# Patient Record
Sex: MALE | Race: WHITE
[De-identification: names, ages, dates, MRNs, and addresses within clinical notes are randomized per-mention and may not be internally consistent; named-entity substitution may affect disease eponyms.]

---

## 2018-06-22 ENCOUNTER — HOSPITAL ENCOUNTER (INPATIENT)
Dept: HOSPITAL 96 - M.ICU | Age: 68
LOS: 6 days | Discharge: HOME | DRG: 417 | End: 2018-06-28
Attending: INTERNAL MEDICINE | Admitting: INTERNAL MEDICINE
Payer: MEDICARE

## 2018-06-22 VITALS — DIASTOLIC BLOOD PRESSURE: 102 MMHG | SYSTOLIC BLOOD PRESSURE: 145 MMHG

## 2018-06-22 VITALS — DIASTOLIC BLOOD PRESSURE: 88 MMHG | SYSTOLIC BLOOD PRESSURE: 144 MMHG

## 2018-06-22 VITALS — BODY MASS INDEX: 38.06 KG/M2 | HEIGHT: 72 IN | WEIGHT: 281 LBS

## 2018-06-22 VITALS — DIASTOLIC BLOOD PRESSURE: 86 MMHG | SYSTOLIC BLOOD PRESSURE: 142 MMHG

## 2018-06-22 VITALS — DIASTOLIC BLOOD PRESSURE: 86 MMHG | SYSTOLIC BLOOD PRESSURE: 153 MMHG

## 2018-06-22 DIAGNOSIS — R65.11: ICD-10-CM

## 2018-06-22 DIAGNOSIS — Z95.1: ICD-10-CM

## 2018-06-22 DIAGNOSIS — Z79.4: ICD-10-CM

## 2018-06-22 DIAGNOSIS — Z79.899: ICD-10-CM

## 2018-06-22 DIAGNOSIS — Z79.01: ICD-10-CM

## 2018-06-22 DIAGNOSIS — I25.10: ICD-10-CM

## 2018-06-22 DIAGNOSIS — E44.1: ICD-10-CM

## 2018-06-22 DIAGNOSIS — N18.4: ICD-10-CM

## 2018-06-22 DIAGNOSIS — Z82.49: ICD-10-CM

## 2018-06-22 DIAGNOSIS — E78.5: ICD-10-CM

## 2018-06-22 DIAGNOSIS — D64.9: ICD-10-CM

## 2018-06-22 DIAGNOSIS — K85.10: ICD-10-CM

## 2018-06-22 DIAGNOSIS — N17.9: ICD-10-CM

## 2018-06-22 DIAGNOSIS — N40.0: ICD-10-CM

## 2018-06-22 DIAGNOSIS — K80.12: Primary | ICD-10-CM

## 2018-06-22 DIAGNOSIS — E11.22: ICD-10-CM

## 2018-06-22 DIAGNOSIS — Z88.6: ICD-10-CM

## 2018-06-22 DIAGNOSIS — Z80.9: ICD-10-CM

## 2018-06-22 DIAGNOSIS — I13.0: ICD-10-CM

## 2018-06-22 DIAGNOSIS — E66.01: ICD-10-CM

## 2018-06-22 DIAGNOSIS — D68.9: ICD-10-CM

## 2018-06-22 DIAGNOSIS — Z79.2: ICD-10-CM

## 2018-06-22 DIAGNOSIS — K21.9: ICD-10-CM

## 2018-06-22 DIAGNOSIS — Z83.3: ICD-10-CM

## 2018-06-22 DIAGNOSIS — R00.1: ICD-10-CM

## 2018-06-22 DIAGNOSIS — I48.2: ICD-10-CM

## 2018-06-22 DIAGNOSIS — I73.9: ICD-10-CM

## 2018-06-22 LAB
ABSOLUTE BASOPHILS: 0 THOU/UL (ref 0–0.2)
ABSOLUTE EOSINOPHILS: 0.1 THOU/UL (ref 0–0.7)
ABSOLUTE MONOCYTES: 0.5 THOU/UL (ref 0–1.2)
ALBUMIN SERPL-MCNC: 3.1 G/DL (ref 3.4–5)
ALP SERPL-CCNC: 54 U/L (ref 46–116)
ALT SERPL-CCNC: 18 U/L (ref 30–65)
ANION GAP SERPL CALC-SCNC: 7 MMOL/L (ref 7–16)
APTT BLD: 48.8 SECONDS (ref 25–31.3)
AST SERPL-CCNC: 44 U/L (ref 15–37)
BASOPHILS NFR BLD AUTO: 0.6 %
BILIRUB SERPL-MCNC: 1.9 MG/DL
BUN SERPL-MCNC: 36 MG/DL (ref 7–18)
CALCIUM SERPL-MCNC: 8.5 MG/DL (ref 8.5–10.1)
CHLORIDE SERPL-SCNC: 101 MMOL/L (ref 98–107)
CO2 SERPL-SCNC: 27 MMOL/L (ref 21–32)
CREAT SERPL-MCNC: 2.4 MG/DL (ref 0.6–1.3)
EOSINOPHIL NFR BLD: 1.3 %
GLUCOSE SERPL-MCNC: 133 MG/DL (ref 70–99)
GRANULOCYTES NFR BLD MANUAL: 80.8 %
HCT VFR BLD CALC: 29.8 % (ref 42–52)
HGB BLD-MCNC: 9.8 GM/DL (ref 14–18)
INR PPP: 2.3
LIPASE: 631 U/L (ref 73–393)
LYMPHOCYTES # BLD: 0.8 THOU/UL (ref 0.8–5.3)
LYMPHOCYTES NFR BLD AUTO: 10.7 %
MAGNESIUM SERPL-MCNC: 2.7 MG/DL (ref 1.8–2.4)
MCH RBC QN AUTO: 30.1 PG (ref 26–34)
MCHC RBC AUTO-ENTMCNC: 32.9 G/DL (ref 28–37)
MCV RBC: 91.3 FL (ref 80–100)
MONOCYTES NFR BLD: 6.6 %
MPV: 10.1 FL. (ref 7.2–11.1)
NEUTROPHILS # BLD: 6.1 THOU/UL (ref 1.6–8.1)
NUCLEATED RBCS: 0 /100WBC
PLATELET COUNT*: 172 THOU/UL (ref 150–400)
POTASSIUM SERPL-SCNC: 4 MMOL/L (ref 3.5–5.1)
PROT SERPL-MCNC: 6.2 G/DL (ref 6.4–8.2)
PROTHROMBIN TIME: 22.1 SECONDS (ref 9.2–11.5)
RBC # BLD AUTO: 3.27 MIL/UL (ref 4.5–6)
RDW-CV: 16.1 % (ref 10.5–14.5)
SODIUM SERPL-SCNC: 135 MMOL/L (ref 136–145)
WBC # BLD AUTO: 7.5 THOU/UL (ref 4–11)

## 2018-06-23 VITALS — SYSTOLIC BLOOD PRESSURE: 129 MMHG | DIASTOLIC BLOOD PRESSURE: 69 MMHG

## 2018-06-23 VITALS — SYSTOLIC BLOOD PRESSURE: 144 MMHG | DIASTOLIC BLOOD PRESSURE: 81 MMHG

## 2018-06-23 VITALS — SYSTOLIC BLOOD PRESSURE: 140 MMHG | DIASTOLIC BLOOD PRESSURE: 89 MMHG

## 2018-06-23 VITALS — SYSTOLIC BLOOD PRESSURE: 145 MMHG | DIASTOLIC BLOOD PRESSURE: 83 MMHG

## 2018-06-23 VITALS — SYSTOLIC BLOOD PRESSURE: 140 MMHG | DIASTOLIC BLOOD PRESSURE: 87 MMHG

## 2018-06-23 VITALS — SYSTOLIC BLOOD PRESSURE: 137 MMHG | DIASTOLIC BLOOD PRESSURE: 77 MMHG

## 2018-06-23 VITALS — DIASTOLIC BLOOD PRESSURE: 75 MMHG | SYSTOLIC BLOOD PRESSURE: 137 MMHG

## 2018-06-23 VITALS — DIASTOLIC BLOOD PRESSURE: 78 MMHG | SYSTOLIC BLOOD PRESSURE: 137 MMHG

## 2018-06-23 VITALS — SYSTOLIC BLOOD PRESSURE: 155 MMHG | DIASTOLIC BLOOD PRESSURE: 84 MMHG

## 2018-06-23 VITALS — DIASTOLIC BLOOD PRESSURE: 98 MMHG | SYSTOLIC BLOOD PRESSURE: 142 MMHG

## 2018-06-23 VITALS — SYSTOLIC BLOOD PRESSURE: 141 MMHG | DIASTOLIC BLOOD PRESSURE: 82 MMHG

## 2018-06-23 VITALS — DIASTOLIC BLOOD PRESSURE: 87 MMHG | SYSTOLIC BLOOD PRESSURE: 150 MMHG

## 2018-06-23 LAB
ALBUMIN SERPL-MCNC: 3 G/DL (ref 3.4–5)
ALP SERPL-CCNC: 48 U/L (ref 46–116)
ALT SERPL-CCNC: 19 U/L (ref 30–65)
ANION GAP SERPL CALC-SCNC: 8 MMOL/L (ref 7–16)
AST SERPL-CCNC: 44 U/L (ref 15–37)
BILIRUB SERPL-MCNC: 2 MG/DL
BUN SERPL-MCNC: 34 MG/DL (ref 7–18)
BUN SERPL-MCNC: 34 MG/DL (ref 7–18)
CALCIUM SERPL-MCNC: 8.4 MG/DL (ref 8.5–10.1)
CHLORIDE SERPL-SCNC: 104 MMOL/L (ref 98–107)
CO2 SERPL-SCNC: 25 MMOL/L (ref 21–32)
CREAT SERPL-MCNC: 2.2 MG/DL (ref 0.6–1.3)
CREAT SERPL-MCNC: 2.2 MG/DL (ref 0.6–1.3)
GLUCOSE SERPL-MCNC: 83 MG/DL (ref 70–99)
HCT VFR BLD CALC: 28.2 % (ref 42–52)
HGB BLD-MCNC: 9.4 GM/DL (ref 14–18)
INR PPP: 1.4
MAGNESIUM SERPL-MCNC: 2.3 MG/DL (ref 1.8–2.4)
MCH RBC QN AUTO: 30.7 PG (ref 26–34)
MCHC RBC AUTO-ENTMCNC: 33.4 G/DL (ref 28–37)
MCV RBC: 91.7 FL (ref 80–100)
MPV: 10.6 FL. (ref 7.2–11.1)
PLATELET COUNT*: 159 THOU/UL (ref 150–400)
POTASSIUM SERPL-SCNC: 4.1 MMOL/L (ref 3.5–5.1)
PROT SERPL-MCNC: 5.5 G/DL (ref 6.4–8.2)
PROTHROMBIN TIME: 13.7 SECONDS (ref 9.2–11.5)
RBC # BLD AUTO: 3.07 MIL/UL (ref 4.5–6)
RDW-CV: 16.1 % (ref 10.5–14.5)
SODIUM SERPL-SCNC: 137 MMOL/L (ref 136–145)
SODIUM SERPL-SCNC: 137 MMOL/L (ref 136–145)
WBC # BLD AUTO: 7 THOU/UL (ref 4–11)

## 2018-06-23 NOTE — EKG
[place] [address]
Phone:  [phone]                     ELECTROCARDIOGRAM REPORT      
_______________________________________________________________________________
 
Name:       KARLIE JULIEN JR             Room:           [address]    ADM IN  
M.R.#:  [id number]      Account #:      [id number]  
Admission:  18     Attend Phys:    Cuauhtemoc Villeda MD 
Discharge:               Date of Birth:  [birth year]  
         Report #: [id number]
    [id number]
_______________________________________________________________________________
THIS REPORT FOR:  //name//                      
 
                          [facility]
                                       
Test Date:    2018               Test Time:    20:08:58
Pat Name:     KARLIE JULIEN            Department:   
Patient ID:   [id number]            Room:         [address]
Gender:       M                        Technician:   JB GOEL
:          1950               Requested By: Cuauhtemoc Villeda
Order Number: [id number]    Reading MD:   Norberto Olson
                                 Measurements
Intervals                              Axis          
Rate:         70                       P:            
ID:                                    QRS:          86
QRSD:         154                      T:            -14
QT:           424                                    
QTc:          458                                    
                           Interpretive Statements
Atrial fibrillation
Right bundle branch block
ST depr, consider ischemia, anterolateral lds
Compared to ECG 2014 10:40:37
Right bundle-branch block now present
Possible ischemia now present
Sinus bradycardia no longer present
 
Electronically Signed On 2018 12:49:49 CDT by Norberto Olson
[email or web address]
 
 
 
 
 
 
 
 
 
 
 
 
 
 
 
  <ELECTRONICALLY SIGNED>
                                           By: Norberto Olson MD, [facility]   
  18     1249
D: 18   _____________________________________
T: 18   Norberto Olson MD, [facility]     /EPI

## 2018-06-24 VITALS — SYSTOLIC BLOOD PRESSURE: 121 MMHG | DIASTOLIC BLOOD PRESSURE: 96 MMHG

## 2018-06-24 VITALS — DIASTOLIC BLOOD PRESSURE: 73 MMHG | SYSTOLIC BLOOD PRESSURE: 118 MMHG

## 2018-06-24 VITALS — DIASTOLIC BLOOD PRESSURE: 84 MMHG | SYSTOLIC BLOOD PRESSURE: 148 MMHG

## 2018-06-24 VITALS — SYSTOLIC BLOOD PRESSURE: 147 MMHG | DIASTOLIC BLOOD PRESSURE: 81 MMHG

## 2018-06-24 VITALS — DIASTOLIC BLOOD PRESSURE: 76 MMHG | SYSTOLIC BLOOD PRESSURE: 124 MMHG

## 2018-06-24 VITALS — DIASTOLIC BLOOD PRESSURE: 85 MMHG | SYSTOLIC BLOOD PRESSURE: 127 MMHG

## 2018-06-24 VITALS — DIASTOLIC BLOOD PRESSURE: 79 MMHG | SYSTOLIC BLOOD PRESSURE: 121 MMHG

## 2018-06-24 LAB
ALBUMIN SERPL-MCNC: 2.6 G/DL (ref 3.4–5)
ALP SERPL-CCNC: 45 U/L (ref 46–116)
ALT SERPL-CCNC: 17 U/L (ref 30–65)
ANION GAP SERPL CALC-SCNC: 6 MMOL/L (ref 7–16)
AST SERPL-CCNC: 50 U/L (ref 15–37)
BILIRUB SERPL-MCNC: 1.7 MG/DL
BUN SERPL-MCNC: 23 MG/DL (ref 7–18)
CALCIUM SERPL-MCNC: 8.4 MG/DL (ref 8.5–10.1)
CHLORIDE SERPL-SCNC: 106 MMOL/L (ref 98–107)
CO2 SERPL-SCNC: 25 MMOL/L (ref 21–32)
CREAT SERPL-MCNC: 1.9 MG/DL (ref 0.6–1.3)
GLUCOSE SERPL-MCNC: 118 MG/DL (ref 70–99)
HCT VFR BLD CALC: 26.1 % (ref 42–52)
HGB BLD-MCNC: 8.8 GM/DL (ref 14–18)
INR PPP: 1.2
MAGNESIUM SERPL-MCNC: 2.1 MG/DL (ref 1.8–2.4)
MCH RBC QN AUTO: 30.9 PG (ref 26–34)
MCHC RBC AUTO-ENTMCNC: 33.7 G/DL (ref 28–37)
MCV RBC: 91.7 FL (ref 80–100)
MPV: 9.3 FL. (ref 7.2–11.1)
PLATELET COUNT*: 156 THOU/UL (ref 150–400)
POTASSIUM SERPL-SCNC: 3.9 MMOL/L (ref 3.5–5.1)
PROT SERPL-MCNC: 5.5 G/DL (ref 6.4–8.2)
PROTHROMBIN TIME: 11.9 SECONDS (ref 9.2–11.5)
RBC # BLD AUTO: 2.84 MIL/UL (ref 4.5–6)
RDW-CV: 15.7 % (ref 10.5–14.5)
SODIUM SERPL-SCNC: 137 MMOL/L (ref 136–145)
WBC # BLD AUTO: 4.6 THOU/UL (ref 4–11)

## 2018-06-24 NOTE — CON
[address]  [address]                    CONSULTATION                  
_______________________________________________________________________________
 
Name:       KARLIE JULIEN JR             Room:           [address] [place]  
[facility]#:  [id number]      Account #:      [id number]  
Admission:  06/22/18     Attend Phys:    Cuauhtemoc Villeda MD 
Discharge:               Date of Birth:  [birth year]  
         Report #: [id number]
                                                                     [id number]  
_______________________________________________________________________________
THIS REPORT FOR:  //name//                      
 
CC: Cuauhtemoc Ramos
 
DATE OF SERVICE:  06/23/2018
 
 
PRIMARY CARE PHYSICIAN:  Dr. Justin Ramos.
 
CARDIOLOGIST:  Dr. Kvng Taylor.
 
CHIEF COMPLAINT:  Abdominal pain and bradycardia.
 
HISTORY OF PRESENT ILLNESS:  The patient is a 68-year-old man with a history of
remote bypass surgery and atrial fibrillation which is persistent.  He presented
to the Emergency Room at [facility] with abdominal pain.  There was
concern he had cholecystitis.  However, we are asked to see him in transfer
because of severe bradycardia, heart rates down in the 30s-40s.  He apparently
received an injection of Dilaudid, got dizzy and lightheadedness and heart rate
decreased to the 20s.  He had been on a high dose of beta blocker for rate
control of his atrial fibrillation.  Over the last 24 hours, his beta blocker
has been held and currently his heart rates are in the 70s.
 
He has been having abdominal discomfort in his lower right quadrant for the past
week or so.  He denies fevers, chills, nausea, vomiting.  He has been taking
p.o.  He denies constipation.
 
From a cardiovascular standpoint, he has a history of remote CABG, but denies
chest pain or pressure actively.
 
He denies weight loss or edema.
 
He does have chronic lower extremity foot and leg swelling.
 
PAST MEDICAL HISTORY:  Status post CABG 3 vessels in 1996 at [facility], persistent atrial fibrillation, hyperlipidemia, morbid obesity and
chronic kidney disease, type 2 diabetes mellitus.  Warfarin anticoagulation.
 
HOME MEDICATIONS:  Include atorvastatin 80 mg daily, fenofibrate 145 mg daily,
Lasix 40 mg daily, glipizide, metformin, metoprolol 100 mg, Protonix,
tamsulosin, warfarin 2.5 mg daily.
 
SOCIAL HISTORY:  Nonsmoker.
 
REVIEW OF SYSTEMS: 
 
 
 
[place] [address]                    CONSULTATION                  
_______________________________________________________________________________
 
Name:       KARLIE JULIEN JR             Room:           [address]#:  [id number]      Account #:      [id number]  
Admission:  06/22/18     Attend Phys:    Cuauhtemoc Villeda MD 
Discharge:               Date of Birth:  [birth year]  
         Report #: [id number]
                                                                     [id number]  
_______________________________________________________________________________
CARDIOVASCULAR:  No chest pain.  Positive dyspnea on exertion.  Positive edema. 
No palpitations.
GENERAL:  No weight loss or fevers.
RESPIRATORY:  No cough or sputum production.
ENDOCRINE:  Positive diabetes. No thyroid disease.
GASTROINTESTINAL:  No bleeding, hematemesis or melena.
GENITOURINARY:  No dysuria or hematuria.
HEMATOLOGIC:  No anemia or bleeding disorders.
ALLERGIES:  No seasonal, medical, aspirin or contrast dye.
PSYCHIATRIC:  No depression or anxiety.
MUSCULOSKELETAL:  No arthritis, connective tissue disease.
SKIN:  No rashes.
EYES:  He does use glasses.
EARS, NOSE, THROAT AND MOUTH:  No decreased hearing.  Positive dentures.
 
PHYSICAL EXAMINATION:
VITAL SIGNS:  Blood pressure is 120/70, atrial fibrillation in the 70s.
GENERAL:  Pleasant elderly male.  He is alert, oriented, no apparent distress.
HEENT:  Eyes are intact.  No facial asymmetry.
NECK:  Supple.  No jugular venous distention.
CARDIOVASCULAR:  Irregular.  I cannot hear a rub or gallop.
LUNGS:  Clear to auscultation.
ABDOMEN:  Soft, nontender.
 
LABORATORY DATA:  From [facility]:  He had a bradycardic episode with
heart rate in the 30s, in atrial fibrillation ,with a baseline wider QRS
complex.  Hemoglobin is 9.0.  Glucose 127.  Sodium 136, potassium 3.7, chloride
102, BUN is 40, creatinine is 2.7.  INR was 8.1.  Hemoglobin is 9.2, white blood
cell count 7.8, platelet count 164,000.
 
IMPRESSION:
1.  Bradycardia, currently after holding his beta blockers and with IV fluids,
his heart rate is stable in the 70s in atrial fibrillation, his baseline rhythm.
 We will continue to monitor this closely in the ICU.  This might have been a
combination of underlying medication toxicity with his beta blocker as well as
decreased clearance from his kidney disease and a vasovagal event.
2.  Atrial fibrillation persisted.  As noted above.  We will continue telemetry
monitoring and holding his AV genie acting agents.
3.  Preoperative cardiovascular examination.  I do not have records from his
last stress test, which was a nuclear stress test at [facility], but
clinically the patient has not been having any angina type symptoms and presents
without heart failure.  He was estimated to be a moderate cardiovascular risk
for event, and I do not see any contraindication to surgery.
4.  Coronary artery disease, status post coronary artery bypass graft,
clinically he remains asymptomatic for angina, but does have continued risk
given the age of his bypass grafts.  We will research when his last
 
 
 
[facility] 
[address] [place]  [address]                    CONSULTATION                  
_______________________________________________________________________________
 
Name:       KARLIE JULIEN JR             Room:           [address] [place]  
.R.#:  [id number]      Account #:      [id number]  
Admission:  06/22/18     Attend Phys:    Cuauhtemoc Villeda MD 
Discharge:               Date of Birth:  [birth year]  
         Report #: [id number]
                                                                     [id number]  
_______________________________________________________________________________
echocardiogram was.
5.  Coagulopathy, currently his INR is 1.4.  I think they gave him FFP in
[place].
 
 
 
 
 
 
 
 
 
 
 
 
 
 
 
 
 
 
 
 
 
 
 
 
 
 
 
 
 
 
 
 
 
 
 
 
 
 
 
 
 
<ELECTRONICALLY SIGNED>
                                        By:  Norberto Olson MD, FACC   
06/24/18     1150
D: 06/23/18 1059_______________________________________
T: 06/23/18 1404Norberto Olson MD, FACC      /nt

## 2018-06-25 VITALS — DIASTOLIC BLOOD PRESSURE: 87 MMHG | SYSTOLIC BLOOD PRESSURE: 140 MMHG

## 2018-06-25 VITALS — DIASTOLIC BLOOD PRESSURE: 84 MMHG | SYSTOLIC BLOOD PRESSURE: 140 MMHG

## 2018-06-25 VITALS — DIASTOLIC BLOOD PRESSURE: 78 MMHG | SYSTOLIC BLOOD PRESSURE: 123 MMHG

## 2018-06-25 VITALS — DIASTOLIC BLOOD PRESSURE: 95 MMHG | SYSTOLIC BLOOD PRESSURE: 136 MMHG

## 2018-06-25 VITALS — SYSTOLIC BLOOD PRESSURE: 145 MMHG | DIASTOLIC BLOOD PRESSURE: 59 MMHG

## 2018-06-25 LAB
ABSOLUTE BASOPHILS: 0 THOU/UL (ref 0–0.2)
ABSOLUTE EOSINOPHILS: 0.1 THOU/UL (ref 0–0.7)
ABSOLUTE MONOCYTES: 0.5 THOU/UL (ref 0–1.2)
ALBUMIN SERPL-MCNC: 2.7 G/DL (ref 3.4–5)
ALP SERPL-CCNC: 41 U/L (ref 46–116)
ALT SERPL-CCNC: 20 U/L (ref 30–65)
ANION GAP SERPL CALC-SCNC: 9 MMOL/L (ref 7–16)
AST SERPL-CCNC: 60 U/L (ref 15–37)
BASOPHILS NFR BLD AUTO: 0.7 %
BILIRUB SERPL-MCNC: 1.5 MG/DL
BUN SERPL-MCNC: 16 MG/DL (ref 7–18)
CALCIUM SERPL-MCNC: 9 MG/DL (ref 8.5–10.1)
CHLORIDE SERPL-SCNC: 108 MMOL/L (ref 98–107)
CO2 SERPL-SCNC: 23 MMOL/L (ref 21–32)
CREAT SERPL-MCNC: 1.8 MG/DL (ref 0.6–1.3)
EOSINOPHIL NFR BLD: 3 %
GLUCOSE SERPL-MCNC: 138 MG/DL (ref 70–99)
GRANULOCYTES NFR BLD MANUAL: 65.3 %
HCT VFR BLD CALC: 25.8 % (ref 42–52)
HGB BLD-MCNC: 8.7 GM/DL (ref 14–18)
LIPASE: 323 U/L (ref 73–393)
LYMPHOCYTES # BLD: 0.8 THOU/UL (ref 0.8–5.3)
LYMPHOCYTES NFR BLD AUTO: 19.4 %
MAGNESIUM SERPL-MCNC: 2 MG/DL (ref 1.8–2.4)
MCH RBC QN AUTO: 30.8 PG (ref 26–34)
MCHC RBC AUTO-ENTMCNC: 33.7 G/DL (ref 28–37)
MCV RBC: 91.4 FL (ref 80–100)
MONOCYTES NFR BLD: 11.6 %
MPV: 8.9 FL. (ref 7.2–11.1)
NEUTROPHILS # BLD: 2.6 THOU/UL (ref 1.6–8.1)
NUCLEATED RBCS: 0 /100WBC
PHOSPHATE SERPL-MCNC: 2 MG/DL (ref 2.5–4.9)
PLATELET COUNT*: 164 THOU/UL (ref 150–400)
POTASSIUM SERPL-SCNC: 3.8 MMOL/L (ref 3.5–5.1)
PROT SERPL-MCNC: 5.7 G/DL (ref 6.4–8.2)
RBC # BLD AUTO: 2.82 MIL/UL (ref 4.5–6)
RDW-CV: 15.9 % (ref 10.5–14.5)
SODIUM SERPL-SCNC: 140 MMOL/L (ref 136–145)
WBC # BLD AUTO: 3.9 THOU/UL (ref 4–11)

## 2018-06-25 NOTE — [place] [address]
Phone:  [phone]                     ELECTROCARDIOGRAM REPORT      
_______________________________________________________________________________
 
Name:       KARLIE JULIEN JR             Room:           [address]    ADM IN  
[facility]#:  [id number]      Account #:      [id number]  
Admission:  18     Attend Phys:    Cuauhtemoc Villeda MD 
Discharge:               Date of Birth:  [birth year]  
         Report #: [id number]
    [id number]
_______________________________________________________________________________
THIS REPORT FOR:  //name//                      
 
                          [facility]
                                       
Test Date:    2018               Test Time:    10:14:50
Pat Name:     KARLIE JULIEN            Department:   
Patient ID:   [id number]            Room:         [address]
Gender:       M                        Technician:   
:          1950               Requested By: Cuauhtemoc Villeda
Order Number: [id number]    Keily MD:   Kvng Taylor
                                 Measurements
Intervals                              Axis          
Rate:         103                      P:            
SC:                                    QRS:          92
QRSD:         143                      T:            -27
QT:           378                                    
QTc:          495                                    
                           Interpretive Statements
Atrial fibrillation
RBBB and LPFB
ST depr, consider ischemia, anterolateral lds
Compared to ECG 2018 20:08:58
 
Possible ischemia still present
 
Electronically Signed On 2018 10:30:03 CDT by Kvng Taylor
[email or web address]
 
 
 
 
 
 
 
 
 
 
 
 
 
 
 
 
  <ELECTRONICALLY SIGNED>
                                           By: Kvng Taylor MD, [facility]      
  18     1030
D: 18 1014   _____________________________________
T: 18 1014   Kvng Taylor MD, [facility]        /EPI

## 2018-06-26 VITALS — DIASTOLIC BLOOD PRESSURE: 86 MMHG | SYSTOLIC BLOOD PRESSURE: 149 MMHG

## 2018-06-26 VITALS — SYSTOLIC BLOOD PRESSURE: 128 MMHG | DIASTOLIC BLOOD PRESSURE: 73 MMHG

## 2018-06-26 VITALS — DIASTOLIC BLOOD PRESSURE: 80 MMHG | SYSTOLIC BLOOD PRESSURE: 145 MMHG

## 2018-06-26 VITALS — DIASTOLIC BLOOD PRESSURE: 93 MMHG | SYSTOLIC BLOOD PRESSURE: 143 MMHG

## 2018-06-26 VITALS — DIASTOLIC BLOOD PRESSURE: 80 MMHG | SYSTOLIC BLOOD PRESSURE: 131 MMHG

## 2018-06-26 VITALS — SYSTOLIC BLOOD PRESSURE: 150 MMHG | DIASTOLIC BLOOD PRESSURE: 98 MMHG

## 2018-06-26 VITALS — SYSTOLIC BLOOD PRESSURE: 147 MMHG | DIASTOLIC BLOOD PRESSURE: 83 MMHG

## 2018-06-26 LAB
ALBUMIN SERPL-MCNC: 2.6 G/DL (ref 3.4–5)
ALP SERPL-CCNC: 43 U/L (ref 46–116)
ALT SERPL-CCNC: 22 U/L (ref 30–65)
ANION GAP SERPL CALC-SCNC: 8 MMOL/L (ref 7–16)
AST SERPL-CCNC: 53 U/L (ref 15–37)
BILIRUB SERPL-MCNC: 1.3 MG/DL
BUN SERPL-MCNC: 16 MG/DL (ref 7–18)
CALCIUM SERPL-MCNC: 8.4 MG/DL (ref 8.5–10.1)
CHLORIDE SERPL-SCNC: 107 MMOL/L (ref 98–107)
CO2 SERPL-SCNC: 24 MMOL/L (ref 21–32)
CREAT SERPL-MCNC: 1.9 MG/DL (ref 0.6–1.3)
GLUCOSE SERPL-MCNC: 174 MG/DL (ref 70–99)
HCT VFR BLD CALC: 26.6 % (ref 42–52)
HGB BLD-MCNC: 8.6 GM/DL (ref 14–18)
MAGNESIUM SERPL-MCNC: 1.6 MG/DL (ref 1.8–2.4)
MCH RBC QN AUTO: 30.5 PG (ref 26–34)
MCHC RBC AUTO-ENTMCNC: 32.5 G/DL (ref 28–37)
MCV RBC: 93.7 FL (ref 80–100)
MPV: 9.2 FL. (ref 7.2–11.1)
PLATELET COUNT*: 200 THOU/UL (ref 150–400)
POTASSIUM SERPL-SCNC: 4.1 MMOL/L (ref 3.5–5.1)
PROT SERPL-MCNC: 5.5 G/DL (ref 6.4–8.2)
RBC # BLD AUTO: 2.84 MIL/UL (ref 4.5–6)
RDW-CV: 16.2 % (ref 10.5–14.5)
SODIUM SERPL-SCNC: 139 MMOL/L (ref 136–145)
WBC # BLD AUTO: 3.5 THOU/UL (ref 4–11)

## 2018-06-26 PROCEDURE — BF131ZZ FLUOROSCOPY OF GALLBLADDER AND BILE DUCTS USING LOW OSMOLAR CONTRAST: ICD-10-PCS | Performed by: SURGERY

## 2018-06-26 PROCEDURE — 0FT44ZZ RESECTION OF GALLBLADDER, PERCUTANEOUS ENDOSCOPIC APPROACH: ICD-10-PCS | Performed by: SURGERY

## 2018-06-27 VITALS — SYSTOLIC BLOOD PRESSURE: 135 MMHG | DIASTOLIC BLOOD PRESSURE: 78 MMHG

## 2018-06-27 VITALS — SYSTOLIC BLOOD PRESSURE: 135 MMHG | DIASTOLIC BLOOD PRESSURE: 77 MMHG

## 2018-06-27 VITALS — DIASTOLIC BLOOD PRESSURE: 70 MMHG | SYSTOLIC BLOOD PRESSURE: 125 MMHG

## 2018-06-27 VITALS — DIASTOLIC BLOOD PRESSURE: 83 MMHG | SYSTOLIC BLOOD PRESSURE: 127 MMHG

## 2018-06-27 VITALS — DIASTOLIC BLOOD PRESSURE: 94 MMHG | SYSTOLIC BLOOD PRESSURE: 143 MMHG

## 2018-06-27 VITALS — SYSTOLIC BLOOD PRESSURE: 130 MMHG | DIASTOLIC BLOOD PRESSURE: 90 MMHG

## 2018-06-27 LAB
ALBUMIN SERPL-MCNC: 2.6 G/DL (ref 3.4–5)
ALP SERPL-CCNC: 58 U/L (ref 46–116)
ALT SERPL-CCNC: 77 U/L (ref 30–65)
ANION GAP SERPL CALC-SCNC: 6 MMOL/L (ref 7–16)
AST SERPL-CCNC: 490 U/L (ref 15–37)
BILIRUB SERPL-MCNC: 2.2 MG/DL
BUN SERPL-MCNC: 16 MG/DL (ref 7–18)
CALCIUM SERPL-MCNC: 8.4 MG/DL (ref 8.5–10.1)
CHLORIDE SERPL-SCNC: 106 MMOL/L (ref 98–107)
CO2 SERPL-SCNC: 25 MMOL/L (ref 21–32)
CREAT SERPL-MCNC: 1.8 MG/DL (ref 0.6–1.3)
GLUCOSE SERPL-MCNC: 158 MG/DL (ref 70–99)
HCT VFR BLD CALC: 26.4 % (ref 42–52)
HGB BLD-MCNC: 8.6 GM/DL (ref 14–18)
MAGNESIUM SERPL-MCNC: 1.5 MG/DL (ref 1.8–2.4)
MCH RBC QN AUTO: 30.7 PG (ref 26–34)
MCHC RBC AUTO-ENTMCNC: 32.8 G/DL (ref 28–37)
MCV RBC: 93.7 FL (ref 80–100)
MPV: 8.8 FL. (ref 7.2–11.1)
PLATELET COUNT*: 191 THOU/UL (ref 150–400)
POTASSIUM SERPL-SCNC: 5 MMOL/L (ref 3.5–5.1)
PROT SERPL-MCNC: 5.5 G/DL (ref 6.4–8.2)
RBC # BLD AUTO: 2.81 MIL/UL (ref 4.5–6)
RDW-CV: 16.4 % (ref 10.5–14.5)
SODIUM SERPL-SCNC: 137 MMOL/L (ref 136–145)
WBC # BLD AUTO: 4.7 THOU/UL (ref 4–11)

## 2018-06-28 VITALS — DIASTOLIC BLOOD PRESSURE: 90 MMHG | SYSTOLIC BLOOD PRESSURE: 130 MMHG

## 2018-06-28 VITALS — DIASTOLIC BLOOD PRESSURE: 82 MMHG | SYSTOLIC BLOOD PRESSURE: 134 MMHG

## 2018-06-28 VITALS — DIASTOLIC BLOOD PRESSURE: 89 MMHG | SYSTOLIC BLOOD PRESSURE: 130 MMHG

## 2018-06-28 VITALS — DIASTOLIC BLOOD PRESSURE: 87 MMHG | SYSTOLIC BLOOD PRESSURE: 127 MMHG

## 2018-06-28 VITALS — SYSTOLIC BLOOD PRESSURE: 130 MMHG | DIASTOLIC BLOOD PRESSURE: 90 MMHG

## 2018-06-28 VITALS — DIASTOLIC BLOOD PRESSURE: 82 MMHG | SYSTOLIC BLOOD PRESSURE: 146 MMHG

## 2018-06-28 LAB
ALBUMIN SERPL-MCNC: 2.5 G/DL (ref 3.4–5)
ALP SERPL-CCNC: 82 U/L (ref 46–116)
ALT SERPL-CCNC: 87 U/L (ref 30–65)
ANION GAP SERPL CALC-SCNC: 7 MMOL/L (ref 7–16)
AST SERPL-CCNC: 428 U/L (ref 15–37)
BILIRUB SERPL-MCNC: 2.7 MG/DL
BUN SERPL-MCNC: 16 MG/DL (ref 7–18)
CALCIUM SERPL-MCNC: 8.3 MG/DL (ref 8.5–10.1)
CHLORIDE SERPL-SCNC: 105 MMOL/L (ref 98–107)
CO2 SERPL-SCNC: 24 MMOL/L (ref 21–32)
CREAT SERPL-MCNC: 1.8 MG/DL (ref 0.6–1.3)
GLUCOSE SERPL-MCNC: 196 MG/DL (ref 70–99)
HCT VFR BLD CALC: 26.3 % (ref 42–52)
HGB BLD-MCNC: 8.7 GM/DL (ref 14–18)
INR PPP: 1.5
MCH RBC QN AUTO: 30.9 PG (ref 26–34)
MCHC RBC AUTO-ENTMCNC: 33.2 G/DL (ref 28–37)
MCV RBC: 93.1 FL (ref 80–100)
MPV: 8.6 FL. (ref 7.2–11.1)
PLATELET COUNT*: 178 THOU/UL (ref 150–400)
POTASSIUM SERPL-SCNC: 4.7 MMOL/L (ref 3.5–5.1)
PROT SERPL-MCNC: 5.6 G/DL (ref 6.4–8.2)
PROTHROMBIN TIME: 14.8 SECONDS (ref 9.2–11.5)
RBC # BLD AUTO: 2.83 MIL/UL (ref 4.5–6)
RDW-CV: 17.1 % (ref 10.5–14.5)
SODIUM SERPL-SCNC: 136 MMOL/L (ref 136–145)
WBC # BLD AUTO: 4.4 THOU/UL (ref 4–11)

## 2018-07-07 NOTE — OP
[address]  [address]                    OPERATIVE REPORT              
_______________________________________________________________________________
 
Name:       KARLIE JULIEN JR             Room:           [address] [place]  
[facility]#:  [id number]      Account #:      [id number]  
Admission:  06/22/18     Attend Phys:    Cuauhtemoc Villeda MD 
Discharge:  06/28/18     Date of Birth:  [birth year]  
         Report #: [id number]
                                                                     [id number]  
_______________________________________________________________________________
THIS REPORT FOR:  //name//                      
 
CC: Cuauhtemoc Ramos
 
DICTATED BY: Pipo Gonzalez DO
 
DATE OF SERVICE:  06/26/2018
 
 
PREPROCEDURE DIAGNOSES:  Chronic cholecystitis, cholelithiasis and acute
pancreatitis.
 
POSTOPERATIVE DIAGNOSES:  Chronic cholecystitis, cholelithiasis and acute
pancreatitis, gangrenous cholecystitis.
 
SURGEON:  Karina Chen DO
 
ASSISTANT:  Joby Gonzalez, PGY-3.
 
OPERATION:  Laparoscopic cholecystectomy with IOC and intraoperative
interpretation by surgeon, 15-[place] FRANCISCO drain placement and Surgicel placement
within the gallbladder fossa.
 
ANESTHESIA TYPE:  General and local.
 
ESTIMATED BLOOD LOSS:  30 mL.
 
SPECIMEN REMOVED:  Gallbladder.
 
COMPLICATIONS:  None.
 
Findings were delayed and contrast slow with the IOC but no evidence of obvious
obstruction.
 
HISTORY OF PRESENT ILLNESS:  The patient is a pleasant 68-year-old male who was
transferred from [facility] on 06/22/2018 for abdominal pain, epigastric
discomfort and right upper quadrant radiation.  He was found to have acute
pancreatitis along with gallbladder dysfunction with a contracted gallbladder
ultrasound.  The patient was admitted to [facility] for treatment and
likely cholecystectomy, but was complicated by some bradycardia at that time. 
He also had some encephalopathic events, which are exacerbated by Dilaudid.  The
patient was unable to receive cardiac care at the hospital, so he was
transferred here for further treatment.  During his hospital stay here, the
patient had abdominal ultrasound as well as MRCP done.  The gallbladder was seen
on ultrasound and was packed with stones.  The gallbladder wall appeared to be
 
 
 
[place] [address]                    OPERATIVE REPORT              
_______________________________________________________________________________
 
Name:       KARLIE JULIEN JR             Room:           [address] [place]  
[facility]#:  [id number]      Account #:      [id number]  
Admission:  06/22/18     Attend Phys:    Cuauhtemoc Villeda MD 
Discharge:  06/28/18     Date of Birth:  [birth year]  
         Report #: [id number]
                                                                     [id number]  
_______________________________________________________________________________
thickened with some intramural edema suspicious for cholecystitis.  The MRCP
that was performed showed no evidence of biliary occlusion.  There was
contracted gallbladder and thickened wall again seen with numerous intraluminal
gallstones.  His labs which were initially elevated were trending down, more
specifically his total bilirubin.  The patient was cleared by Cardiology and we
had decided to proceed with laparoscopic cholecystectomy.  A complete
description of procedure was reviewed in detail with the patient.  All risks,
benefits, complications discussed and include but are not limited to infection,
bleeding, hernia at incision sites, injury to bowel or colon, injury to stomach,
injury to intrahepatic and extrahepatic ducts along with common bile duct, other
common complications associated with the procedure and anesthesia risks.  The
patient voiced understanding and wished to proceed.
 
DESCRIPTION OF PROCEDURE:  After the appropriate consents were obtained, the
patient was taken to the operating room and laid in supine position.  He had
SCDs placed on bilateral lower extremities and all lines were placed by
Anesthesia.  The patient's right arm was tucked to the side and his left arm was
placed out.  A foot plate was placed at the end of the bed and the patient had a
safety strap placed across his lap.  He was given perioperative antibiotics
consisting of 2 grams of Ancef and anesthesia sedated.  The patient was
subsequently intubated without difficulty.  His abdomen was prepped and draped
in standard sterile fashion.  A timeout was performed to correctly identify the
patient and procedure.  We started with some 0.5% Marcaine at the supraumbilical
site where we would make a future incision.  Using 11 blade scalpel, we made our
skin incision and dissected down through the subcutaneous tissue using blunt
dissection with S retractors.  Once we encountered the anterior abdominal wall,
fascia was grasped and elevated between 2 Kocher clamps.  The fascia was then
incised using electrocautery.  A 0 Vicryl suture was placed on either side of
the fascia to act as stay sutures for Yoan trocar.  We entered the abdominal
cavity using hemostat bluntly and a finger was used to sweep the incision to
ensure there were no adhesions present and none were.  We introduced the Yoan
trocar along with the camera and secured it using our previously placed 0 Vicryl
sutures.  On initial introduction of the camera, it appeared the patient's liver
was high riding in his right upper quadrant and there was extensive amount of
omentum that was present.  We started with our subxiphoid port, which was placed
under direct visualization.  We then placed our 2 right lateral ports also under
direct visualization.  We swept the omentum down and placed the patient in a
head up and rotated left position and this allowed us to obtain a better view of
the inferior edge of the liver and the patient had an extensive amount of
adhesions to the gallbladder from the omentum.  We had difficulty keeping the
omentum retracted, so we did place another 11 mm trocar in his right abdomen in
order to place a fan retractor.  This allowed us to completely visualize the
gallbladder.  The patient had multiple large stones within the gallbladder and
it appeared that partially one of the stones was attempting to erode through the
distal aspect of the gallbladder.  There was no obvious perforation, but there
was extensive amount of inflammation and adhesions.  Lysis of adhesions along
 
 
 
[facility] 
[address] [place]  [address]                    OPERATIVE REPORT              
_______________________________________________________________________________
 
Name:       KARLIE JULIEN JR             Room:           [address] [place]  
[facility]#:  [id number]      Account #:      [id number]  
Admission:  06/22/18     Attend Phys:    Cuauhtemoc Villeda MD 
Discharge:  06/28/18     Date of Birth:  [birth year]  
         Report #: [id number]
                                                                     [id number]  
_______________________________________________________________________________
the gallbladder took approximately 30 minutes to take down.  Once all the
adhesions were taken down, we were able to grasp the gallbladder along the mid
portion of it and retracted cephalad to give us a better visualization of the
Bing's pouch.  We dissected along Bing's pouch and opened the peritoneum
overlying it and this allowed us to use blunt dissection to dissect out our
cystic artery and cystic duct.  Cystic artery was coursing posteriorly to our
duct.  It was at this time that we were able to obtain our critical view.  There
were 2 structures only, 2 structures coursing directly into the gallbladder.  We
were able to visualize the inferior edge of the liver along with the cystic duct
and cystic artery.  Then, using the Loving clamp, we clamped across the
Bing's pouch of the gallbladder and inserted our catheter into the cystic
duct.  We initially aspirated bile and then we flushed with normal saline.  It
flushed easily and we were able to bring in the C-arm for the intraoperative
cholangiogram portion of our surgery.  After bringing in the C-arm, we exchanged
normal saline syringe for 50/50 contrast.  We first started with injecting some
contrast and were able to visualize the common bile duct very easily along with
the cystic duct.  The signal on the x-ray was rather weak, so we exchanged the
half contrast for full strength contrast.  After we obtained this, we then again
used fluoro to further visualize the structures.  There was some slow filling of
the common bile duct at distal aspect, but there was contrast that was seen
moving into the duodenum.  Then, looking more cephalad, we were able to
visualize the intrahepatic ducts and there did not appear to be any blockage at
that time.  Pictures of those were saved and we removed our Loving clamp.  We
then again identified our cystic duct and cystic artery and placed clips across
both.  We placed 3 clips proximally on the cystic duct, 1 clip distally.  We
placed 2 clips proximally on cystic artery and 1 distally.  Using laparoscopic
scissors, we were able to incise the duct and the artery.  The remainder of the
gallbladder was excised from the liver bed using electrocautery.  The patient's
gallbladder was very thin walled and was severely inflamed.  Dissection was
difficult, but we were able to remove the gallbladder completely.  There were a
few stones that had spilled during the dissection, but these were found and
obtained.  The gallbladder along with the stones were placed within an EndoCatch
pouch.  There was some minimal bleeding from the liver bed, which was adequately
controlled using electrocautery.  We did elect to place a sheet of Surgicel
along the gallbladder fossa to further enhance our hemostasis of this area.  We
also elected to place 15-[place] FRANCISCO drain.  We placed this drain through the
subxiphoid port and pulled it out through our right lateral abdominal port.  It
was sutured into place using a 2-0 nylon suture.  We placed our drain at the
inferior aspect of the liver and along Morison's pouch.  Once we were pleased
with the drain placement and all the fluid was running clear with our suction
, we were able to once again visualize our clips, which appeared to be
intact.  There was no bleeding or oozing from the cystic duct stump or the
cystic artery stump.  We suctioned away any remaining fluid and decided to
remove our trocars under direct visualization.  There was no bleeding from any
of the trocar sites and we allowed the patient's abdomen to desufflate.  Once it
was completely desufflated, we removed our Yoan trocar as well as our camera
 
 
 
[facility]  [address]                    OPERATIVE REPORT              
_______________________________________________________________________________
 
Name:       KARLIE JULIEN JR             Room:           [address] [place]  
..#:  [id number]      Account #:      [id number]  
Admission:  06/22/18     Attend Phys:    Cuauhtemoc Villeda MD 
Discharge:  06/28/18     Date of Birth:  [birth year]  
         Report #: [id number]
                                                                     [id number]  
_______________________________________________________________________________
and took the gallbladder out of the abdominal cavity within the EndoCatch pouch
through the supraumbilical incision.  We then closed our fascial incision using
0 Vicryl suture in a figure-of-eight fashion.  This allowed us to achieve good
approximation of the fascia and prior to tying down the suture, we were able to
use a finger tensor.  There were no hany-incisional structures that had snuck up
into our incision and none were present.  The suture was then tied down and the
fascia was anesthetized using 0.5% Marcaine.  We used a 3-0 Vicryl in an
inverted interrupted fashion to close the subcutaneous tissue of this incision. 
We also used a 3-0 Vicryl on the 11 mm right lateral abdominal port and the
subxiphoid port.  Using 4-0 Monocryl in inverted interrupted fashion, we were
able to close the skin incisions.  Each of the incision sites were then further
anesthetized using 0.5% Marcaine.  The patient's abdomen was cleaned and dried
adequately and Mastisol, Steri-Strips, gauze and a sterile Tegaderm Op-Site were
applied over each incision site.  The drain had drain sponge placed along with a
Tegaderm.  The patient tolerated the procedure well.  He was allowed to awaken
and was subsequently extubated in the operating room.  He was then transported
to his cart very carefully and will be transported back to the PACU where he
will be allowed to recover.  Once he recovers, he will be transferred to his
room for further observation.  Counts were correct x 2 at the initial procedure
and Dr. Chen was present and scrubbed for the entire procedure.
 
 
 
 
 
 
 
 
 
 
 
 
 
 
 
 
 
 
 
 
 
 
 
 
<ELECTRONICALLY SIGNED>
                                        By:  Karina Chen DO         
07/07/18     1345
D: 06/26/18 1527_______________________________________
T: 06/26/18 1624Calfa Chen DO            /nt

## 2018-07-27 NOTE — PATH
[address]  [address]                    PATHOLOGY RPT PROCEDURE       
_______________________________________________________________________________
 
Name:       JOEY JULIEN JR             Room:           [address] [place]  
..#:  [id number]      Account #:      [id number]  
Admission:  06/22/18     Date of Birth:  [birth year]  
Discharge:  06/28/18                   Report #:    [id number]
                                                         Path Case #: [id number]
_______________________________________________________________________________
 
[facility] Accession Number: [id number]
.                                                                01
Material submitted:                                        .
GALLBLADDER AND CONTENTS
.                                                                01
Clinical history:                                          .
Cholecystitis with cholelithiasis and pancreatitis
.                                                                02
**********************************************************************
Diagnosis:
Gallbladder and contents:
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
cholelithiasis.
(JENNY:; 6/29/18)
QRQ/06/29/2018
**********************************************************************
.                                                                02
Electronically signed:                                     .
Lul Coffey MD, Pathologist
NPI- [id number]
.                                                                01
Gross description:                                         .
The specimen is received in formalin, labeled "Joey Julien Jr,
gallbladder" and consists of a previously opened, tan-brown and ragged
gallbladder measuring 4.6 x 1.6 x 1.3 cm.  The mucosa is green and velvety
to gray-tan, hemorrhagic, and shiny/smooth with an average wall thickness
of 0.2 cm.  There are multiple black and gravel-like calculi measuring up
to 0.6 cm which grossly appear to occlude the neck aspect/cystic duct.
There is a single smooth and black calculus in the container which
measures 1.9 x 1.6 cm.  No polyps or mass lesions are identified.
Representative sections are submitted in A1.
(SDY; 6/27/2018)
SYU/SYU
.                                                                02
Pathologist provided ICD-10:
K80.12
.                                                                02
CPT                                                        .
425057
***Performed at:  01
   [address], [place]  [id number]
   MD Aba Earl MD Phone:  [phone]
***Performed at:  02
   [facility]
   [address], [place], [place]  [id number]
   MD Lul Coffey MD Phone:  [phone]